# Patient Record
(demographics unavailable — no encounter records)

---

## 2017-04-20 NOTE — DIAGNOSTIC IMAGING REPORT
Three views of the SI joints.



INDICATION: Back pain.



FINDINGS: There are mild sclerotic degenerative changes of the

SI joints bilaterally. No fracture or dislocation seen. No

radiopaque foreign body. Vascular calcifications are seen in the

pelvis.



IMPRESSION: Mild degenerative changes.



Dictated by:



Dictated on workstation # [id number]

## 2017-04-20 NOTE — DIAGNOSTIC IMAGING REPORT
Three views of the cervical spine.



INDICATION: Chronic neck pain.



FINDINGS:  There is satisfactory alignment of the cervical

spine. The vertebral body heights are preserved. There is mild

disc height loss at C4-C5 and moderate disc height loss at C6-C7

level. Mild anterior osteophyte at C6-7 is seen. No significant

posterior osteophyte. The lateral masses of C1 and C2 appear

well aligned. The prevertebral soft tissues appear unremarkable.



IMPRESSION: Mild degenerative changes.



Dictated by:



Dictated on workstation # [id number]

## 2017-04-20 NOTE — DIAGNOSTIC IMAGING REPORT
Three views of the lumbar spine.



INDICATION: Back pain.



FINDINGS:

There is grade 1 spondylolisthesis of L4 over L5. The vertebral

body heights are preserved. Disc heights demonstrate mild disc

height loss along L4-L5 level. There are minimal anterior

osteophytes at multiple levels. No significant posterior

osteophyte is seen.



The SI joints appear unremarkable. There is minimal right

convexity curvature of the lumbar spine, could be positional.



Surgical clips in the upper right abdomen seen.



IMPRESSION: Grade 1 L4 over L5 spondylolisthesis. Mild

degenerative changes.



Dictated by:



Dictated on workstation # [id number]

## 2017-07-26 NOTE — DIAGNOSTIC IMAGING REPORT
Three views of each hand performed.



INDICATION: Polyarthralgia. Hand swelling.



FINDINGS:



There are osteoarthritis changes in the distal interphalangeal

joints. This is seen bilaterally with slight subluxation and

angulation seen at the DIP joint of the right index finger. There

could be an element of prior injury. There are no definite

erosive changes seen.



No fracture, dislocation or radiopaque foreign body is seen on

either side.



There are prominent osteophytes seen at the DIP joints

bilaterally.



IMPRESSION: Findings suggestive of osteoarthritis predominantly

involving the DIP joints bilaterally.



Dictated by: 



  Dictated on workstation # [id number]

## 2018-01-05 NOTE — DIAGNOSTIC IMAGING REPORT
PROCEDURE: MRI lumbar spine with and without contrast.



TECHNIQUE:  Multiplanar, multisequence MRI of the lumbar spine

was performed with and without contrast.



INDICATION: Back pain.



COMPARISON: There are no previous MRI examinations available for

comparison.



FINDINGS: The T2 parasagittal images show slight anterior

translation of L4 with respect to L5. There is also desiccation

of the disc at this level and there is a disc bulge eccentric to

the right at the L4-L5 level. The disc compresses the right

ventral aspect of the thecal sac and narrows the AP diameter to

approximately 12.2 mm. There is moderate narrowing of the

neuroforamen on the right at this level as well.



The other intervertebral spaces are fairly well maintained,

although there is desiccation of the discs at every level. The

thecal sac is relatively generous. There is no evidence for

spinal stenosis or nerve root encroachment at any level.



There is no abnormal signal arising from the osseous structures

to suggest bone edema or fracture. There is no abnormal

enhancement on the post contrast series. Specifically, there is

no abnormality of the cord.



There is no evidence for a paraspinal mass.



As noted on the previous CT abdomen/pelvis exam of 11/23/2015,

the left kidney is atrophic.



IMPRESSION:

1. There is moderate degenerative disc disease throughout the

lumbar spine. This includes a disc bulge to the right at L4-L5.

There is no evidence for central stenosis at the L4-L5 level but

there is moderate narrowing of the neuroforamen on the right at

this level.

2. The remainder of the lumbar spine is unremarkable for spinal

stenosis or nerve root encroachment.

3. There is no sign of an acute bony abnormality.

4. There is no abnormal enhancement on the post contrast series.



Dictated by: 



  Dictated on workstation # [id number]